# Patient Record
Sex: MALE | HISPANIC OR LATINO | URBAN - METROPOLITAN AREA
[De-identification: names, ages, dates, MRNs, and addresses within clinical notes are randomized per-mention and may not be internally consistent; named-entity substitution may affect disease eponyms.]

---

## 2017-06-26 ENCOUNTER — INPATIENT (INPATIENT)
Facility: HOSPITAL | Age: 32
LOS: 1 days | Discharge: HOME | End: 2017-06-28
Attending: INTERNAL MEDICINE | Admitting: INTERNAL MEDICINE
Payer: MEDICAID

## 2017-06-26 VITALS — WEIGHT: 149.91 LBS | HEIGHT: 66 IN

## 2017-06-26 DIAGNOSIS — Z98.89 OTHER SPECIFIED POSTPROCEDURAL STATES: Chronic | ICD-10-CM

## 2017-06-26 LAB
ALBUMIN SERPL ELPH-MCNC: 3.8 G/DL — SIGNIFICANT CHANGE UP (ref 3.3–5)
ALP SERPL-CCNC: 84 U/L — SIGNIFICANT CHANGE UP (ref 40–120)
ALT FLD-CCNC: 53 U/L — SIGNIFICANT CHANGE UP (ref 12–78)
ANION GAP SERPL CALC-SCNC: 8 MMOL/L — SIGNIFICANT CHANGE UP (ref 5–17)
APPEARANCE UR: CLEAR — SIGNIFICANT CHANGE UP
APTT BLD: 30.6 SEC — SIGNIFICANT CHANGE UP (ref 27.5–37.4)
AST SERPL-CCNC: 41 U/L — HIGH (ref 15–37)
BACTERIA # UR AUTO: (no result)
BASOPHILS # BLD AUTO: 0.1 K/UL — SIGNIFICANT CHANGE UP (ref 0–0.2)
BASOPHILS NFR BLD AUTO: 2.1 % — HIGH (ref 0–2)
BILIRUB SERPL-MCNC: 2 MG/DL — HIGH (ref 0.2–1.2)
BILIRUB UR-MCNC: NEGATIVE — SIGNIFICANT CHANGE UP
BUN SERPL-MCNC: 16 MG/DL — SIGNIFICANT CHANGE UP (ref 7–23)
CALCIUM SERPL-MCNC: 9.1 MG/DL — SIGNIFICANT CHANGE UP (ref 8.5–10.1)
CHLORIDE SERPL-SCNC: 101 MMOL/L — SIGNIFICANT CHANGE UP (ref 96–108)
CO2 SERPL-SCNC: 28 MMOL/L — SIGNIFICANT CHANGE UP (ref 22–31)
COLOR SPEC: (no result)
CREAT SERPL-MCNC: 1.46 MG/DL — HIGH (ref 0.5–1.3)
DIFF PNL FLD: (no result)
EOSINOPHIL # BLD AUTO: 0 K/UL — SIGNIFICANT CHANGE UP (ref 0–0.5)
EOSINOPHIL NFR BLD AUTO: 1 % — SIGNIFICANT CHANGE UP (ref 0–6)
EPI CELLS # UR: SIGNIFICANT CHANGE UP
GLUCOSE SERPL-MCNC: 109 MG/DL — HIGH (ref 70–99)
GLUCOSE UR QL: NEGATIVE MG/DL — SIGNIFICANT CHANGE UP
HCT VFR BLD CALC: 45 % — SIGNIFICANT CHANGE UP (ref 39–50)
HGB BLD-MCNC: 15.4 G/DL — SIGNIFICANT CHANGE UP (ref 13–17)
INR BLD: 1.18 RATIO — HIGH (ref 0.88–1.16)
KETONES UR-MCNC: NEGATIVE — SIGNIFICANT CHANGE UP
LACTATE SERPL-SCNC: 1.4 MMOL/L — SIGNIFICANT CHANGE UP (ref 0.7–2)
LEUKOCYTE ESTERASE UR-ACNC: (no result)
LYMPHOCYTES # BLD AUTO: 2.4 K/UL — SIGNIFICANT CHANGE UP (ref 1–3.3)
LYMPHOCYTES # BLD AUTO: 41 % — SIGNIFICANT CHANGE UP (ref 13–44)
MANUAL DIF COMMENT BLD-IMP: SIGNIFICANT CHANGE UP
MCHC RBC-ENTMCNC: 29.7 PG — SIGNIFICANT CHANGE UP (ref 27–34)
MCHC RBC-ENTMCNC: 34.1 GM/DL — SIGNIFICANT CHANGE UP (ref 32–36)
MCV RBC AUTO: 87.1 FL — SIGNIFICANT CHANGE UP (ref 80–100)
MONOCYTES # BLD AUTO: 1.4 K/UL — HIGH (ref 0–0.9)
MONOCYTES NFR BLD AUTO: 18 % — HIGH (ref 2–14)
NEUTROPHILS # BLD AUTO: 2 K/UL — SIGNIFICANT CHANGE UP (ref 1.8–7.4)
NEUTROPHILS NFR BLD AUTO: 31 % — LOW (ref 43–77)
NEUTS BAND # BLD: 1 % — SIGNIFICANT CHANGE UP (ref 0–8)
NITRITE UR-MCNC: NEGATIVE — SIGNIFICANT CHANGE UP
PH UR: 9 — HIGH (ref 5–8)
PLAT MORPH BLD: (no result)
PLATELET # BLD AUTO: (no result) (ref 150–400)
POTASSIUM SERPL-MCNC: 4.2 MMOL/L — SIGNIFICANT CHANGE UP (ref 3.5–5.3)
POTASSIUM SERPL-SCNC: 4.2 MMOL/L — SIGNIFICANT CHANGE UP (ref 3.5–5.3)
PROT SERPL-MCNC: 8.8 GM/DL — HIGH (ref 6–8.3)
PROT UR-MCNC: 30 MG/DL
PROTHROM AB SERPL-ACNC: 12.8 SEC — HIGH (ref 9.8–12.7)
RAPID RVP RESULT: SIGNIFICANT CHANGE UP
RBC # BLD: 5.18 M/UL — SIGNIFICANT CHANGE UP (ref 4.2–5.8)
RBC # FLD: 11.6 % — SIGNIFICANT CHANGE UP (ref 10.3–14.5)
RBC BLD AUTO: NORMAL — SIGNIFICANT CHANGE UP
RBC CASTS # UR COMP ASSIST: (no result) /HPF (ref 0–4)
SODIUM SERPL-SCNC: 137 MMOL/L — SIGNIFICANT CHANGE UP (ref 135–145)
SP GR SPEC: 1.01 — SIGNIFICANT CHANGE UP (ref 1.01–1.02)
UROBILINOGEN FLD QL: 8 MG/DL
VARIANT LYMPHS # BLD: 8 % — HIGH (ref 0–6)
WBC # BLD: 5.9 K/UL — SIGNIFICANT CHANGE UP (ref 3.8–10.5)
WBC # FLD AUTO: 5.9 K/UL — SIGNIFICANT CHANGE UP (ref 3.8–10.5)
WBC UR QL: SIGNIFICANT CHANGE UP

## 2017-06-26 PROCEDURE — 99285 EMERGENCY DEPT VISIT HI MDM: CPT

## 2017-06-26 PROCEDURE — 71020: CPT | Mod: 26

## 2017-06-26 PROCEDURE — 93010 ELECTROCARDIOGRAM REPORT: CPT

## 2017-06-26 RX ORDER — ACETAMINOPHEN 500 MG
650 TABLET ORAL EVERY 6 HOURS
Qty: 0 | Refills: 0 | Status: DISCONTINUED | OUTPATIENT
Start: 2017-06-26 | End: 2017-06-28

## 2017-06-26 RX ORDER — AZITHROMYCIN 500 MG/1
500 TABLET, FILM COATED ORAL ONCE
Qty: 0 | Refills: 0 | Status: COMPLETED | OUTPATIENT
Start: 2017-06-26 | End: 2017-06-26

## 2017-06-26 RX ORDER — ONDANSETRON 8 MG/1
4 TABLET, FILM COATED ORAL EVERY 6 HOURS
Qty: 0 | Refills: 0 | Status: DISCONTINUED | OUTPATIENT
Start: 2017-06-26 | End: 2017-06-28

## 2017-06-26 RX ORDER — SODIUM CHLORIDE 9 MG/ML
1000 INJECTION INTRAMUSCULAR; INTRAVENOUS; SUBCUTANEOUS
Qty: 0 | Refills: 0 | Status: DISCONTINUED | OUTPATIENT
Start: 2017-06-26 | End: 2017-06-28

## 2017-06-26 RX ORDER — AZITHROMYCIN 500 MG/1
250 TABLET, FILM COATED ORAL DAILY
Qty: 0 | Refills: 0 | Status: DISCONTINUED | OUTPATIENT
Start: 2017-06-27 | End: 2017-06-28

## 2017-06-26 RX ORDER — ACETAMINOPHEN 500 MG
1000 TABLET ORAL ONCE
Qty: 0 | Refills: 0 | Status: COMPLETED | OUTPATIENT
Start: 2017-06-26 | End: 2017-06-26

## 2017-06-26 RX ORDER — ATOVAQUONE 750 MG/5ML
750 SUSPENSION ORAL ONCE
Qty: 0 | Refills: 0 | Status: COMPLETED | OUTPATIENT
Start: 2017-06-26 | End: 2017-06-26

## 2017-06-26 RX ORDER — ATOVAQUONE 750 MG/5ML
750 SUSPENSION ORAL
Qty: 0 | Refills: 0 | Status: DISCONTINUED | OUTPATIENT
Start: 2017-06-26 | End: 2017-06-28

## 2017-06-26 RX ORDER — SODIUM CHLORIDE 9 MG/ML
2000 INJECTION INTRAMUSCULAR; INTRAVENOUS; SUBCUTANEOUS ONCE
Qty: 0 | Refills: 0 | Status: COMPLETED | OUTPATIENT
Start: 2017-06-26 | End: 2017-06-26

## 2017-06-26 RX ADMIN — Medication 100 MILLIGRAM(S): at 15:35

## 2017-06-26 RX ADMIN — SODIUM CHLORIDE 2000 MILLILITER(S): 9 INJECTION INTRAMUSCULAR; INTRAVENOUS; SUBCUTANEOUS at 12:59

## 2017-06-26 RX ADMIN — ATOVAQUONE 750 MILLIGRAM(S): 750 SUSPENSION ORAL at 15:35

## 2017-06-26 RX ADMIN — Medication 1000 MILLIGRAM(S): at 16:31

## 2017-06-26 RX ADMIN — AZITHROMYCIN 500 MILLIGRAM(S): 500 TABLET, FILM COATED ORAL at 15:35

## 2017-06-26 RX ADMIN — Medication 650 MILLIGRAM(S): at 23:07

## 2017-06-26 RX ADMIN — ATOVAQUONE 750 MILLIGRAM(S): 750 SUSPENSION ORAL at 23:08

## 2017-06-26 RX ADMIN — Medication 100 MILLIGRAM(S): at 23:07

## 2017-06-26 RX ADMIN — SODIUM CHLORIDE 100 MILLILITER(S): 9 INJECTION INTRAMUSCULAR; INTRAVENOUS; SUBCUTANEOUS at 23:05

## 2017-06-26 NOTE — PATIENT PROFILE ADULT. - LANGUAGE ASSISTANCE NEEDED
No-Patient/Caregiver offered and refused free interpretation services./Patient refused  phone at this time, but phone left at bedside with explanation of its use given to patient.

## 2017-06-26 NOTE — PROVIDER CONTACT NOTE (CRITICAL VALUE NOTIFICATION) - SITUATION
JENNIFER De La Rosa notified of above results.  JENNIFER De La Rosa will contact Dr. Naqvi and call back if any changes.

## 2017-06-26 NOTE — ED STATDOCS - PROGRESS NOTE DETAILS
JENNIFER Serrato:   Patient has been seen, evaluated and orders have been written by the attending in intake. Patient is stable.  I will follow up the results of orders written and I will continue to evaluate/observe the patient.  Took Motrin 2 am.  Cough reported.  Denies N/V/D, abdominal pain.    Taylor Serrato PA-C Via interpretor [id number] pt. made aware he must be admitted for medication for Babesiosis.  Taylor Serrato PA-C

## 2017-06-26 NOTE — PATIENT PROFILE ADULT. - VISION (WITH CORRECTIVE LENSES IF THE PATIENT USUALLY WEARS THEM):
Partially impaired: cannot see medication labels or newsprint, but can see obstacles in path, and the surrounding layout; can count fingers at arm's length/uses reading glasses

## 2017-06-26 NOTE — H&P ADULT - HISTORY OF PRESENT ILLNESS
Citizen of [place] speaker,  ID # [id number]    32 y.o. male with no significant PMH presents with fever, chills, HA and body aches for the past week. Pt noticed progression of symptoms and having chest pain with deep inspiration that is exacerbated by cough. Pt feeling dizzy and lightheadedness. No sick contacts or foreign travel recently. Denies insect bites or tick bites. No abd pain, dysuria, or diarrhea. No skin rash. After treatment in ED, pt feeling a little better. Pt works in [employer], [employer]. In ED, pt had fever 102, per PA.    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

## 2017-06-26 NOTE — PROVIDER CONTACT NOTE (OTHER) - SITUATION
Received pt. from ED.  Upon looking at orders RN noticed no DVT prohylaxis except for ambulation.  RN checked labs and Platlets resulted as clumped.  RN notified Dr. Naqvi.

## 2017-06-26 NOTE — H&P ADULT - ASSESSMENT
H[place]ian speaker,  ID # [id number]    32 y.o. male with no significant PMH presents with fever, chills, HA and body aches for the past week. Pt noticed progression of symptoms and having chest pain with deep inspiration that is exacerbated by cough. Pt feeling dizzy and lightheadedness. No sick contacts or foreign travel recently. Denies insect bites or tick bites. No abd pain, dysuria, or diarrhea. No skin rash. After treatment in ED, pt feeling a little better. Pt works in [employer], [employer]. In ED, pt had fever 102.8.    #sepsis (fever 102.8, tachycardia 113) due to parasite infection, Babesiosis  #ALISON  #transaminitis  #lightheaded, dizzy  -per ED, babesia on peripheral smear  -admit to med surg  -f/u blood parasite culture  -obtain blood culture  -continue IV hydration  -check west nile virus, ehrlichia Ab, Lyme C6 Ab, viral hepatitis panel  -cont atorvaquone 750mg BID, azithro 250mg daily starting 6/27, doxycycline 100mg BID  -antipyretics  -ID consult, Dr Casanova  -EKG reviewed with my interpretation: HR 93, QTc 425, normal sinus rhythm, normal axis, no ST-T changes    #DVT ppx  -early ambulation Vietnamese speaker,  ID # [id number]    32 y.o. male with no significant PMH presents with fever, chills, HA and body aches for the past week. Pt noticed progression of symptoms and having chest pain with deep inspiration that is exacerbated by cough. Pt feeling dizzy and lightheadedness. No sick contacts or foreign travel recently. Denies insect bites or tick bites. No abd pain, dysuria, or diarrhea. No skin rash. After treatment in ED, pt feeling a little better. Pt works in [employer], [employer] In ED, pt had fever 102.8.    #sepsis (fever 102.8, tachycardia 113) due to parasite infection, Babesiosis  #ALISON  #transaminitis  #lightheaded, dizzy  -per ED, babesia on peripheral smear  -admit to med surg  -f/u blood parasite culture  -obtain blood culture  -continue IV hydration  -check west nile virus, ehrlichia Ab, Lyme C6 Ab, viral hepatitis panel  -cont atorvaquone 750mg BID, azithro 250mg daily starting 6/27, doxycycline 100mg BID  -antipyretics  -case discussed over phone with ID consult, Dr Casanova  -EKG reviewed with my interpretation: HR 93, QTc 425, normal sinus rhythm, normal axis, no ST-T changes    #DVT ppx  -early ambulation

## 2017-06-26 NOTE — ED STATDOCS - OBJECTIVE STATEMENT
33 y/o male with no PMHx presents to the ED c/o fever, HA, neck pain, CP and upper back pain starting a week ago. Pt also states slight productive cough, that is worse at night. Denies SOB, neck stiffness, abd pain. Non smoker. Drinks socially. No recent travel. No recent sick contact. Pt recently had surgery on eye after injury. Pt gets lightheaded and dizzy when walking. 33 y/o male with no PMHx presents to the ED c/o fever, HA, neck pain, CP and upper back pain starting a week ago. Pt also states slight productive cough, that is worse at night. Denies SOB, neck stiffness, abd pain. Non smoker. Drinks socially. No recent travel. No recent sick contact. Pt recently had surgery on eye after injury when a wire went through his left eye. Pt gets lightheaded and dizzy when walking. 31 y/o male with no PMHx presents to the ED c/o fever, HA, neck pain, CP and upper back pain starting a week ago. Pt also states slight productive cough, that is worse at night. Denies SOB, neck stiffness, abd pain. Non smoker. Drinks socially. No recent travel. No recent sick contact. Pt recently had surgery on eye after injury when a wire went through his left eye. Pt gets lightheaded and dizzy when walking. Pt took two Motrin this morning.

## 2017-06-27 DIAGNOSIS — Z98.890 OTHER SPECIFIED POSTPROCEDURAL STATES: Chronic | ICD-10-CM

## 2017-06-27 LAB
ANION GAP SERPL CALC-SCNC: 5 MMOL/L — SIGNIFICANT CHANGE UP (ref 5–17)
B BURGDOR C6 AB SER-ACNC: NEGATIVE — SIGNIFICANT CHANGE UP
B BURGDOR IGG+IGM SER-ACNC: 0.49 INDEX — SIGNIFICANT CHANGE UP (ref 0.01–0.89)
BASOPHILS # BLD AUTO: 0.1 K/UL — SIGNIFICANT CHANGE UP (ref 0–0.2)
BUN SERPL-MCNC: 11 MG/DL — SIGNIFICANT CHANGE UP (ref 7–23)
CALCIUM SERPL-MCNC: 7.9 MG/DL — LOW (ref 8.5–10.1)
CHLORIDE SERPL-SCNC: 106 MMOL/L — SIGNIFICANT CHANGE UP (ref 96–108)
CLOSURE TME COLL+EPINEP BLD: 118 K/UL — LOW (ref 150–400)
CO2 SERPL-SCNC: 26 MMOL/L — SIGNIFICANT CHANGE UP (ref 22–31)
CREAT SERPL-MCNC: 1.13 MG/DL — SIGNIFICANT CHANGE UP (ref 0.5–1.3)
CULTURE RESULTS: SIGNIFICANT CHANGE UP
CULTURE RESULTS: SIGNIFICANT CHANGE UP
EOSINOPHIL # BLD AUTO: 0.1 K/UL — SIGNIFICANT CHANGE UP (ref 0–0.5)
EOSINOPHIL NFR BLD AUTO: 1 % — SIGNIFICANT CHANGE UP (ref 0–6)
GIANT PLATELETS BLD QL SMEAR: SIGNIFICANT CHANGE UP
GLUCOSE SERPL-MCNC: 93 MG/DL — SIGNIFICANT CHANGE UP (ref 70–99)
HAV IGM SER-ACNC: SIGNIFICANT CHANGE UP
HBV CORE IGM SER-ACNC: SIGNIFICANT CHANGE UP
HBV SURFACE AG SER-ACNC: REACTIVE
HCT VFR BLD CALC: 36.8 % — LOW (ref 39–50)
HCV AB S/CO SERPL IA: 0.37 S/CO — SIGNIFICANT CHANGE UP
HCV AB SERPL-IMP: SIGNIFICANT CHANGE UP
HGB BLD-MCNC: 12.3 G/DL — LOW (ref 13–17)
LYME C6 AB IGG/IGM EIA REFLEX WESTERN BL: SIGNIFICANT CHANGE UP
LYMPHOCYTES # BLD AUTO: 2.5 K/UL — SIGNIFICANT CHANGE UP (ref 1–3.3)
LYMPHOCYTES # BLD AUTO: 40 % — SIGNIFICANT CHANGE UP (ref 13–44)
MAGNESIUM SERPL-MCNC: 2.3 MG/DL — SIGNIFICANT CHANGE UP (ref 1.6–2.6)
MANUAL DIF COMMENT BLD-IMP: SIGNIFICANT CHANGE UP
MCHC RBC-ENTMCNC: 29.4 PG — SIGNIFICANT CHANGE UP (ref 27–34)
MCHC RBC-ENTMCNC: 33.3 GM/DL — SIGNIFICANT CHANGE UP (ref 32–36)
MCV RBC AUTO: 88.3 FL — SIGNIFICANT CHANGE UP (ref 80–100)
MONOCYTES # BLD AUTO: 1.8 K/UL — HIGH (ref 0–0.9)
MONOCYTES NFR BLD AUTO: 28 % — HIGH (ref 2–14)
NEUTROPHILS # BLD AUTO: 1.7 K/UL — LOW (ref 1.8–7.4)
NEUTROPHILS NFR BLD AUTO: 23 % — LOW (ref 43–77)
NEUTS BAND # BLD: 2 % — SIGNIFICANT CHANGE UP (ref 0–8)
PHOSPHATE SERPL-MCNC: 2.3 MG/DL — LOW (ref 2.5–4.5)
PLAT MORPH BLD: (no result)
PLATELET # BLD AUTO: 112 K/UL — LOW (ref 150–400)
POTASSIUM SERPL-MCNC: 4.2 MMOL/L — SIGNIFICANT CHANGE UP (ref 3.5–5.3)
POTASSIUM SERPL-SCNC: 4.2 MMOL/L — SIGNIFICANT CHANGE UP (ref 3.5–5.3)
RBC # BLD: 4.17 M/UL — LOW (ref 4.2–5.8)
RBC # FLD: 12.3 % — SIGNIFICANT CHANGE UP (ref 10.3–14.5)
RBC BLD AUTO: NORMAL — SIGNIFICANT CHANGE UP
SODIUM SERPL-SCNC: 137 MMOL/L — SIGNIFICANT CHANGE UP (ref 135–145)
SPECIMEN SOURCE: SIGNIFICANT CHANGE UP
SPECIMEN SOURCE: SIGNIFICANT CHANGE UP
VARIANT LYMPHS # BLD: 6 % — SIGNIFICANT CHANGE UP (ref 0–6)
WBC # BLD: 6.2 K/UL — SIGNIFICANT CHANGE UP (ref 3.8–10.5)
WBC # FLD AUTO: 6.2 K/UL — SIGNIFICANT CHANGE UP (ref 3.8–10.5)

## 2017-06-27 PROCEDURE — 76700 US EXAM ABDOM COMPLETE: CPT | Mod: 26

## 2017-06-27 RX ORDER — IBUPROFEN 200 MG
0 TABLET ORAL
Qty: 0 | Refills: 0 | COMMUNITY

## 2017-06-27 RX ADMIN — Medication 650 MILLIGRAM(S): at 22:00

## 2017-06-27 RX ADMIN — SODIUM CHLORIDE 100 MILLILITER(S): 9 INJECTION INTRAMUSCULAR; INTRAVENOUS; SUBCUTANEOUS at 05:25

## 2017-06-27 RX ADMIN — SODIUM CHLORIDE 100 MILLILITER(S): 9 INJECTION INTRAMUSCULAR; INTRAVENOUS; SUBCUTANEOUS at 18:13

## 2017-06-27 RX ADMIN — ATOVAQUONE 750 MILLIGRAM(S): 750 SUSPENSION ORAL at 17:04

## 2017-06-27 RX ADMIN — AZITHROMYCIN 250 MILLIGRAM(S): 500 TABLET, FILM COATED ORAL at 12:04

## 2017-06-27 RX ADMIN — Medication 100 MILLIGRAM(S): at 05:24

## 2017-06-27 RX ADMIN — ATOVAQUONE 750 MILLIGRAM(S): 750 SUSPENSION ORAL at 05:24

## 2017-06-27 NOTE — PROGRESS NOTE ADULT - SUBJECTIVE AND OBJECTIVE BOX
CHIEF COMPLAINT:  fever, chills, HA and body aches for the past week    SUBJECTIVE: no complaints this AM. No dysuria.    REVIEW OF SYSTEMS:  All other review of systems is negative unless indicated above    Vital Signs Last 24 Hrs  T(C): 36.9 (27 Jun 2017 11:14), Max: 37.3 (26 Jun 2017 19:00)  T(F): 98.4 (27 Jun 2017 11:14), Max: 99.1 (26 Jun 2017 19:00)  HR: 88 (27 Jun 2017 11:14) (73 - 88)  BP: 116/60 (27 Jun 2017 11:14) (105/60 - 120/73)  BP(mean): 73 (26 Jun 2017 19:00) (73 - 73)  RR: 16 (27 Jun 2017 11:14) (16 - 18)  SpO2: 99% (27 Jun 2017 11:14) (99% - 100%)    PHYSICAL EXAM:  GEN: appears comfortable  Neuro: AAOx3, nonfocal  HEENT: NC/AT, EOMI  Neck: no thyroidmegaly, no JVD  Cardiovascular: S1S2 present, regular rhythm, no murmur, tachycardia  Respiratory: breath sounds normal bilaterally, no wheezing, no rales, no rhonchi  Gastrointestinal: bowel sounds normal, soft, no abdominal tenderness  Musculoskeletal: no muscle tenderness  Extremities: No edema  Skin: No rash, no insect bites    MEDICATIONS:  MEDICATIONS  (STANDING):  sodium chloride 0.9%. 1000 milliLiter(s) (100 mL/Hr) IV Continuous <Continuous>  atovaquone Suspension 750 milliGRAM(s) Oral two times a day  azithromycin   Tablet 250 milliGRAM(s) Oral daily      LABS: All Labs Reviewed:                        12.3   6.2   )-----------( 112      ( 27 Jun 2017 06:34 )             36.8     06-27    137  |  106  |  11  ----------------------------<  93  4.2   |  26  |  1.13    Ca    7.9<L>      27 Jun 2017 06:34  Phos  2.3     06-27  Mg     2.3     06-27    TPro  8.8<H>  /  Alb  3.8  /  TBili  2.0<H>  /  DBili  x   /  AST  41<H>  /  ALT  53  /  AlkPhos  84  06-26    PT/INR - ( 26 Jun 2017 12:55 )   PT: 12.8 sec;   INR: 1.18 ratio         PTT - ( 26 Jun 2017 12:55 )  PTT:30.6 sec      A/P  32 y.o. male with no significant PMH presents with fever, chills, HA and body aches for the past week. Pt noticed progression of symptoms and having chest pain with deep inspiration that is exacerbated by cough. Pt feeling dizzy and lightheadedness. No sick contacts or foreign travel recently. Denies insect bites or tick bites. No abd pain, dysuria, or diarrhea. No skin rash. After treatment in ED, pt feeling a little better. Pt works in [employer], [employer]. In ED, pt had fever 102.8.  Admitted for babesiosis and found to be hep B positive.    #sepsis (fever 102.8, tachycardia 113) due to parasite infection, Babesiosis  - parasite culture +babesia <1%  - lyme studies negative  - c/w atovaquone and azithro  - doxy dc'd    # Hep B  - HBsAg +  - check remainder of hep b studies    # Anemia- eval for babesiosis related hemolyisis  - LDH, haptoglobin  - monitor Hgb    # thrombocytopenia  - likely relatd to babesiosis  - monitor    # Neutropenia  - Possibly related to babesiosis  - monitor    #ALISON- resolved    #mildly elevated bilirubin possibly related to hemolysis less likely liver etio    #DVT ppx  -early ambulation    Appreicate ID consult

## 2017-06-27 NOTE — CONSULT NOTE ADULT - SUBJECTIVE AND OBJECTIVE BOX
Patient is a 32y old  Male who presents with a chief complaint of fever, chills, HA and body aches for the past week (2017 18:19)      HPI:      32 y.o. male with no significant PMH presents with fever, chills, HA and body aches for the past week. Pt noticed progression of symptoms and having chest pain with deep inspiration that is exacerbated by cough. Pt feeling dizzy and lightheadedness. No sick contacts or foreign travel recently. 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 No abd pain, dysuria, or diarrhea. No skin rash. Denies hx of IV drug use, blood transfusions, is sexually active with multiple partners but no hx of STDs or hepatitis in the past. Here febrile to 102, elevated ast, low plts to 118 noted, peripheral smear positive for babesiosis, pt was started on atovaquone/azithromycin and doxycycline for concern for co-infection. Hep B Ag testing positive.    PMH: as above  PSH: L eye surgery, s/p lens implant last year and replacement 1 month ago          Meds: per reconciliation sheet, noted below    MEDICATIONS  (STANDING):  sodium chloride 0.9%. 1000 milliLiter(s) (100 mL/Hr) IV Continuous <Continuous>  atovaquone Suspension 750 milliGRAM(s) Oral two times a day  azithromycin   Tablet 250 milliGRAM(s) Oral daily  doxycycline hyclate Capsule 100 milliGRAM(s) Oral every 12 hours      Allergies    No Known Allergies    Intolerances        Social: no smoking, social alcohol use, no illegal drugs; no recent travel, no exposure to TB    Family history: NC      ROS: no dizziness, no sore throat, no blurry vision, no CP, no palpitations, no SOB, no cough, no abdominal pain, no diarrhea, no N/V, no dysuria, no leg pain, no claudication, no rash, no joint aches, no rectal pain or bleeding, no night sweats    Vital Signs Last 24 Hrs  T(C): 36.9 (2017 11:14), Max: 39.3 (2017 16:22)  T(F): 98.4 (2017 11:14), Max: 102.8 (2017 16:22)  HR: 88 (2017 11:14) (73 - 98)  BP: 116/60 (2017 11:14) (105/60 - 120/73)  BP(mean): 73 (2017 19:00) (73 - 73)  RR: 16 (2017 11:14) (16 - 18)  SpO2: 99% (2017 11:14) (99% - 100%)      PE:  Constitutional: NAD  HEENT: NC/AT, EOMI, PERRLA  Neck: supple  Back: no tenderness  Respiratory: clear  Cardiovascular: S1S2 regular, no murmurs  Abdomen: soft, not tender, not distended, positive BS  Genitourinary: deferred  Rectal: deferred  Musculoskeletal: no muscle tenderness, no joint swelling or tenderness  Extremities: no pedal edema  Neurological:  no focal deficits  Skin: no rashes    Labs:                        12.3   6.2   )-----------( 112      ( 2017 06:34 )             36.8     06-    137  |  106  |  11  ----------------------------<  93  4.2   |  26  |  1.13    Ca    7.9<L>      2017 06:34  Phos  2.3     -  Mg     2.3         TPro  8.8<H>  /  Alb  3.8  /  TBili  2.0<H>  /  DBili  x   /  AST  41<H>  /  ALT  53  /  AlkPhos  84  06-     LIVER FUNCTIONS - ( 2017 12:55 )  Alb: 3.8 g/dL / Pro: 8.8 gm/dL / ALK PHOS: 84 U/L / ALT: 53 U/L / AST: 41 U/L / GGT: x           Urinalysis Basic - ( 2017 12:55 )    Color: Tavia / Appearance: Clear / S.015 / pH: x  Gluc: x / Ketone: Negative  / Bili: Negative / Urobili: 8 mg/dL   Blood: x / Protein: 30 mg/dL / Nitrite: Negative   Leuk Esterase: Trace / RBC: 3-5 /HPF / WBC 0-2   Sq Epi: x / Non Sq Epi: x / Bacteria: Few    Blood Parasites (17 @ 17:31)    Culture Results:   Babesia species  by Giemsa Stain  Parasitemia = <1 %            Radiology:   < from: Xray Chest 2 Views PA/Lat (17 @ 12:58) >    EXAM:  CHEST P.A. LATERAL                            PROCEDURE DATE:  2017        INTERPRETATION:  CHEST P.A. LATERAL    HISTORY:  Chest pain    Views:  PA and Lateral chest.       Comparison:  none.    The lung fields demonstrate normal degree of inflation.    There is no evidence of pneumonia.    The pulmonary interstitial markings are normal.    There is no evidence of hilar enlargement.    The thoracic aorta outlines normally.    The cardiac size is normal.    The diaphragm is smooth.    There are no pleural effusions. Costophrenic sulci are sharp.     Thoracic curvature is normal.    IMPRESSION: Normal chest radiograph.                            < end of copied text >    Advanced directives addressed: full resuscitation

## 2017-06-27 NOTE — CONSULT NOTE ADULT - ASSESSMENT
32 y.o. male with no significant PMH presents with fever, chills, HA and body aches for the past week. Pt noticed progression of symptoms and having chest pain with deep inspiration that is exacerbated by cough. Pt feeling dizzy and lightheadedness. No sick contacts or foreign travel recently. 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 No abd pain, dysuria, or diarrhea. No skin rash. Denies hx of IV drug use, blood transfusions, is sexually active with multiple partners but no hx of STDs or hepatitis in the past. Here febrile to 102, elevated ast, low plts to 118 noted, peripheral smear positive for babesiosis, pt was started on atovaquone/azithromycin and doxycycline for concern for co-infection. Hep B Ag testing positive.    1. Babesiosis. Hep B s Ag positive.    - agree with atovaquone 750mg BID + azithromycin 500 mg given yesterday now on [id number] day#2 - plan for total 7-10 day course    - lyme screen negative, will discontinue doxycycline     - f/u ehrlichiosis ab, west nile serology    - hep b surface antigen positive, denies prior hx of hep B, will send hep B S antibody, total core ab (igM core (-)), Hb e ag, ab, heb B viral load, and HIV testing    - check abd ultrasound    - hepatitis C testing (-)    - f/u cbc    - monitor temps    - supportive care    - -tolerating abx well so far; no side effects noted    -reason for abx use and side effects reviewed with patient

## 2017-06-28 VITALS
DIASTOLIC BLOOD PRESSURE: 71 MMHG | SYSTOLIC BLOOD PRESSURE: 124 MMHG | TEMPERATURE: 99 F | HEART RATE: 95 BPM | RESPIRATION RATE: 16 BRPM

## 2017-06-28 LAB
ALBUMIN SERPL ELPH-MCNC: 3 G/DL — LOW (ref 3.3–5)
ALP SERPL-CCNC: 62 U/L — SIGNIFICANT CHANGE UP (ref 40–120)
ALT FLD-CCNC: 36 U/L — SIGNIFICANT CHANGE UP (ref 12–78)
ANION GAP SERPL CALC-SCNC: 5 MMOL/L — SIGNIFICANT CHANGE UP (ref 5–17)
AST SERPL-CCNC: 24 U/L — SIGNIFICANT CHANGE UP (ref 15–37)
BILIRUB SERPL-MCNC: 1.2 MG/DL — SIGNIFICANT CHANGE UP (ref 0.2–1.2)
BUN SERPL-MCNC: 10 MG/DL — SIGNIFICANT CHANGE UP (ref 7–23)
CALCIUM SERPL-MCNC: 8.4 MG/DL — LOW (ref 8.5–10.1)
CHLORIDE SERPL-SCNC: 106 MMOL/L — SIGNIFICANT CHANGE UP (ref 96–108)
CO2 SERPL-SCNC: 28 MMOL/L — SIGNIFICANT CHANGE UP (ref 22–31)
CREAT SERPL-MCNC: 1.09 MG/DL — SIGNIFICANT CHANGE UP (ref 0.5–1.3)
GLUCOSE SERPL-MCNC: 95 MG/DL — SIGNIFICANT CHANGE UP (ref 70–99)
HAPTOGLOB SERPL-MCNC: <20 MG/DL — LOW (ref 34–200)
HBV CORE AB SER-ACNC: REACTIVE
HBV E AB SER-ACNC: POSITIVE
HBV E AG SER-ACNC: NEGATIVE — SIGNIFICANT CHANGE UP
HBV SURFACE AB SER-ACNC: SIGNIFICANT CHANGE UP
HCT VFR BLD CALC: 35.5 % — LOW (ref 39–50)
HGB BLD-MCNC: 11.9 G/DL — LOW (ref 13–17)
HIV 1+2 AB+HIV1 P24 AG SERPL QL IA: SIGNIFICANT CHANGE UP
INR BLD: 1.21 RATIO — HIGH (ref 0.88–1.16)
LDH SERPL L TO P-CCNC: 448 U/L — HIGH (ref 50–242)
MCHC RBC-ENTMCNC: 29.8 PG — SIGNIFICANT CHANGE UP (ref 27–34)
MCHC RBC-ENTMCNC: 33.5 GM/DL — SIGNIFICANT CHANGE UP (ref 32–36)
MCV RBC AUTO: 89 FL — SIGNIFICANT CHANGE UP (ref 80–100)
PLATELET # BLD AUTO: 145 K/UL — LOW (ref 150–400)
POTASSIUM SERPL-MCNC: 4.2 MMOL/L — SIGNIFICANT CHANGE UP (ref 3.5–5.3)
POTASSIUM SERPL-SCNC: 4.2 MMOL/L — SIGNIFICANT CHANGE UP (ref 3.5–5.3)
PROT SERPL-MCNC: 6.9 GM/DL — SIGNIFICANT CHANGE UP (ref 6–8.3)
PROTHROM AB SERPL-ACNC: 13.1 SEC — HIGH (ref 9.8–12.7)
RBC # BLD: 3.99 M/UL — LOW (ref 4.2–5.8)
RBC # FLD: 12.2 % — SIGNIFICANT CHANGE UP (ref 10.3–14.5)
SODIUM SERPL-SCNC: 139 MMOL/L — SIGNIFICANT CHANGE UP (ref 135–145)
WBC # BLD: 6 K/UL — SIGNIFICANT CHANGE UP (ref 3.8–10.5)
WBC # FLD AUTO: 6 K/UL — SIGNIFICANT CHANGE UP (ref 3.8–10.5)

## 2017-06-28 RX ORDER — AZITHROMYCIN 500 MG/1
1 TABLET, FILM COATED ORAL
Qty: 6 | Refills: 0 | OUTPATIENT
Start: 2017-06-28 | End: 2017-07-04

## 2017-06-28 RX ORDER — ATOVAQUONE 750 MG/5ML
5 SUSPENSION ORAL
Qty: 60 | Refills: 0 | OUTPATIENT
Start: 2017-06-28 | End: 2017-07-04

## 2017-06-28 RX ADMIN — SODIUM CHLORIDE 100 MILLILITER(S): 9 INJECTION INTRAMUSCULAR; INTRAVENOUS; SUBCUTANEOUS at 05:28

## 2017-06-28 RX ADMIN — Medication 650 MILLIGRAM(S): at 12:04

## 2017-06-28 RX ADMIN — ATOVAQUONE 750 MILLIGRAM(S): 750 SUSPENSION ORAL at 05:29

## 2017-06-28 RX ADMIN — AZITHROMYCIN 250 MILLIGRAM(S): 500 TABLET, FILM COATED ORAL at 12:02

## 2017-06-28 RX ADMIN — ATOVAQUONE 750 MILLIGRAM(S): 750 SUSPENSION ORAL at 20:00

## 2017-06-28 NOTE — PROGRESS NOTE ADULT - ASSESSMENT
32 y.o. male with no significant PMH presents with fever, chills, HA and body aches for the past week. Pt noticed progression of symptoms and having chest pain with deep inspiration that is exacerbated by cough. Pt feeling dizzy and lightheadedness. No sick contacts or foreign travel recently. 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 No abd pain, dysuria, or diarrhea. No skin rash. Denies hx of IV drug use, blood transfusions, is sexually active with multiple partners but no hx of STDs or hepatitis in the past. Here febrile to 102, elevated ast, low plts to 118 noted, peripheral smear positive for babesiosis, pt was started on atovaquone/azithromycin and doxycycline for concern for co-infection. Hep B Ag testing positive.    1. Babesiosis. Hep B s Ag positive.    - continue with atovaquone 750mg BID + azithromycin [id number] day#3 - plan for total 7-10 day course    - lyme screen negative, doxycycline discontinued    - f/u ehrlichiosis ab, west nile serology    - hep b surface antigen positive, denies prior hx of hep B, f/uhep B S antibody, total core ab (igM core (-)), Hb e ag, ab, heb B viral load, and HIV testing    - abd ultrasound unremarkable    - hepatitis C testing (-)    - f/u cbc    - monitor temps    - supportive care    - -tolerating abx well so far; no side effects noted    -reason for abx use and side effects reviewed with patient

## 2017-06-28 NOTE — DISCHARGE NOTE ADULT - PATIENT PORTAL LINK FT
“You can access the Follow[facility] Patient Portal, offered by [facility], by registering with the following website: [email or web address]”

## 2017-06-28 NOTE — DISCHARGE NOTE ADULT - MEDICATION SUMMARY - MEDICATIONS TO TAKE
I will START or STAY ON the medications listed below when I get home from the hospital:    azithromycin 250 mg oral tablet  -- 1 tab(s) by mouth once a day  -- Indication: For Babesiosis    atovaquone 750 mg/5 mL oral suspension  -- 5 milliliter(s) by mouth 2 times a day  -- Indication: For Babesiosis

## 2017-06-28 NOTE — DISCHARGE NOTE ADULT - CARE PLAN
Principal Discharge DX:	Babesiosis  Goal:	resolution  Instructions for follow-up, activity and diet:	complete course of antibiotics  [facility]  Secondary Diagnosis:	Hepatitis B  Instructions for follow-up, activity and diet:	follow up with [facility] Principal Discharge DX:	Babesiosis  Goal:	resolution  Instructions for follow-up, activity and diet:	complete course of antibiotics  follow up in [facility]  Secondary Diagnosis:	Hepatitis B  Instructions for follow-up, activity and diet:	follow up with [facility] Principal Discharge DX:	Babesiosis  Goal:	resolution  Instructions for follow-up, activity and diet:	complete course of antibiotics  follow [facility] in [facility]  Secondary Diagnosis:	Hepatitis B  Instructions for follow-up, activity and diet:	follow up with [facility]

## 2017-06-28 NOTE — PROGRESS NOTE ADULT - SUBJECTIVE AND OBJECTIVE BOX
32 y.o. male with no significant PMH presents with fever, chills, HA and body aches for the past week. Pt noticed progression of symptoms and having chest pain with deep inspiration that is exacerbated by cough. Pt feeling dizzy and lightheadedness. No sick contacts or foreign travel recently. 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 No abd pain, dysuria, or diarrhea. No skin rash. Denies hx of IV drug use, blood transfusions, is sexually active with multiple partners but no hx of STDs or hepatitis in the past. Here febrile to 102, elevated ast, low plts to 118 noted, peripheral smear positive for babesiosis, pt was started on atovaquone/azithromycin and doxycycline for concern for co-infection. Hep B Ag testing positive.      c/o cough and headaches  headache improved though from admission  tmax 100.3    MEDICATIONS  (STANDING):  atovaquone Suspension 750 milliGRAM(s) Oral two times a day  azithromycin   Tablet 250 milliGRAM(s) Oral daily      Vital Signs Last 24 Hrs  T(C): 36.6 (2017 11:48), Max: 37.9 (2017 20:58)  T(F): 97.9 (2017 11:48), Max: 100.3 (2017 20:58)  HR: 91 (2017 11:48) (67 - 96)  BP: 116/65 (2017 11:48) (103/60 - 127/78)  BP(mean): --  RR: 16 (2017 11:48) (16 - 17)  SpO2: 100% (2017 11:48) (99% - 100%)      PE:  Constitutional: NAD  HEENT: NC/AT, EOMI, PERRLA  Neck: supple  Back: no tenderness  Respiratory: clear  Cardiovascular: S1S2 regular, no murmurs  Abdomen: soft, not tender, not distended, positive BS  Genitourinary: deferred  Rectal: deferred  Musculoskeletal: no muscle tenderness, no joint swelling or tenderness  Extremities: no pedal edema  Neurological:  no focal deficits  Skin: no rashes    Labs:                                   11.9   6.0   )-----------( 145      ( 2017 06:17 )             35.5     06-    139  |  106  |  10  ----------------------------<  95  4.2   |  28  |  1.09    Ca    8.4<L>      2017 06:17  Phos  2.3       Mg     2.3         TPro  6.9  /  Alb  3.0<L>  /  TBili  1.2  /  DBili  x   /  AST  24  /  ALT  36  /  AlkPhos  62             Urinalysis Basic - ( 2017 12:55 )    Color: Tavia / Appearance: Clear / S.015 / pH: x  Gluc: x / Ketone: Negative  / Bili: Negative / Urobili: 8 mg/dL   Blood: x / Protein: 30 mg/dL / Nitrite: Negative   Leuk Esterase: Trace / RBC: 3-5 /HPF / WBC 0-2   Sq Epi: x / Non Sq Epi: x / Bacteria: Few    Blood Parasites (17 @ 17:31)    Culture Results:   Babesia species  by Giemsa Stain  Parasitemia = <1 %    Culture - Blood (17 @ 20:58)    Specimen Source: .Blood None    Culture Results:   No growth to date.    Culture - Urine (17 @ 12:55)    Specimen Source: .Urine None    Culture Results:   <10,000 CFU/ml Normal Urogenital cassia present              Radiology:   < from: Xray Chest 2 Views PA/Lat (17 @ 12:58) >    EXAM:  CHEST P.A. LATERAL                                PROCEDURE DATE:  2017        INTERPRETATION:  CHEST P.A. LATERAL    HISTORY:  Chest pain    Views:  PA and Lateral chest.       Comparison:  none.    The lung fields demonstrate normal degree of inflation.    There is no evidence of pneumonia.    The pulmonary interstitial markings are normal.    There is no evidence of hilar enlargement.    The thoracic aorta outlines normally.    The cardiac size is normal.    The diaphragm is smooth.    There are no pleural effusions. Costophrenic sulci are sharp.     Thoracic curvature is normal.    IMPRESSION: Normal chest radiograph.                            < end of copied text >    < from: US Abdomen Complete (17 @ 14:45) >    EXAM:  US COMPLETE ABDOMEN SONOGRAM                            PROCEDURE DATE:  2017        INTERPRETATION:  EXAMINATION DATE: 2017 at 1426 hours.  CLINICAL INFORMATION: Hepatitis B.    TECHNIQUE: Sonographic evaluation of the abdomen was performed. Grayscale   and color Doppler images were acquired.    COMPARISON: None.    FINDINGS:    Liver:  Normal echogenicity and echotexture. No focal mass.  Bile ducts: No intrahepatic or extrahepatic biliary ductal dilatation.   Common bileduct measures 0.2 cm.   Gallbladder:  Contracted. Normal wall thickness.  No pericholecystic   fluid. No tenderness.   Pancreas: Not well seen due to overlying bowel gas.  Spleen: 12.1 cm. Within normal limits.  Right kidney: 10.7 x 4.6 x 4.6 cm. No hydronephrosis.  Left kidney: 9.2 x 4.5 x 4.8 cm. No hydronephrosis.   Ascites: None.    Other: The visualized Aorta and IVC are within normal limits.    IMPRESSION:    Unremarkable abdominal sonogram.    < end of copied text >      Advanced directives addressed: full resuscitation

## 2017-06-28 NOTE — DISCHARGE NOTE ADULT - CARE PROVIDER_API CALL
Co, Johnathon UPTON), Internal Medicine  [address] [place] [address]  Phone: [phone]  Fax: [phone]

## 2017-06-28 NOTE — DISCHARGE NOTE ADULT - PLAN OF CARE
resolution complete course of antibiotics  follow up in [facility] follow up with [facility]

## 2017-06-29 LAB
B MICROTI IGG TITR SER: (no result) TITER
B MICROTI IGM TITR SER: (no result) TITER

## 2017-06-30 LAB
HBV DNA # SERPL NAA+PROBE: SIGNIFICANT CHANGE UP IU/ML
HBV DNA SERPL NAA+PROBE-LOG#: (no result) LOGIU/ML

## 2017-07-02 DIAGNOSIS — D58.8 OTHER SPECIFIED HEREDITARY HEMOLYTIC ANEMIAS: ICD-10-CM

## 2017-07-02 DIAGNOSIS — B60.0 BABESIOSIS: ICD-10-CM

## 2017-07-02 DIAGNOSIS — N17.9 ACUTE KIDNEY FAILURE, UNSPECIFIED: ICD-10-CM

## 2017-07-02 DIAGNOSIS — D69.6 THROMBOCYTOPENIA, UNSPECIFIED: ICD-10-CM

## 2017-07-02 DIAGNOSIS — B19.10 UNSPECIFIED VIRAL HEPATITIS B WITHOUT HEPATIC COMA: ICD-10-CM

## 2017-07-02 DIAGNOSIS — D70.8 OTHER NEUTROPENIA: ICD-10-CM

## 2017-07-02 LAB
CULTURE RESULTS: SIGNIFICANT CHANGE UP
SPECIMEN SOURCE: SIGNIFICANT CHANGE UP

## 2017-07-03 LAB
A PHAGOCYTOPH IGG TITR SER IF: SIGNIFICANT CHANGE UP
A PHAGOCYTOPH IGG TITR SER IF: SIGNIFICANT CHANGE UP
A PHAGOCYTOPH IGM TITR SER IF: SIGNIFICANT CHANGE UP
E CHAFFEENSIS IGG TITR SER: HIGH
E CHAFFEENSIS IGM TITR SER IF: SIGNIFICANT CHANGE UP
EHRLICHIA AB TITR SER: SIGNIFICANT CHANGE UP

## 2017-07-06 DIAGNOSIS — A41.89 OTHER SPECIFIED SEPSIS: ICD-10-CM

## 2017-10-25 ENCOUNTER — OUTPATIENT (OUTPATIENT)
Dept: OUTPATIENT SERVICES | Facility: HOSPITAL | Age: 32
LOS: 1 days | End: 2017-10-25
Payer: COMMERCIAL

## 2017-10-25 ENCOUNTER — APPOINTMENT (OUTPATIENT)
Dept: ULTRASOUND IMAGING | Facility: CLINIC | Age: 32
End: 2017-10-25

## 2017-10-25 DIAGNOSIS — Z98.890 OTHER SPECIFIED POSTPROCEDURAL STATES: Chronic | ICD-10-CM

## 2017-10-25 DIAGNOSIS — Z98.89 OTHER SPECIFIED POSTPROCEDURAL STATES: Chronic | ICD-10-CM

## 2017-10-25 DIAGNOSIS — Z00.8 ENCOUNTER FOR OTHER GENERAL EXAMINATION: ICD-10-CM

## 2017-10-25 PROBLEM — Z00.00 ENCOUNTER FOR PREVENTIVE HEALTH EXAMINATION: Status: ACTIVE | Noted: 2017-10-25

## 2017-10-25 PROCEDURE — 76700 US EXAM ABDOM COMPLETE: CPT | Mod: 26

## 2017-11-09 ENCOUNTER — OUTPATIENT (OUTPATIENT)
Dept: OUTPATIENT SERVICES | Facility: HOSPITAL | Age: 32
LOS: 1 days | Discharge: HOME | End: 2017-11-09

## 2017-11-09 DIAGNOSIS — Z98.89 OTHER SPECIFIED POSTPROCEDURAL STATES: Chronic | ICD-10-CM

## 2017-11-09 DIAGNOSIS — Z98.890 OTHER SPECIFIED POSTPROCEDURAL STATES: Chronic | ICD-10-CM

## 2017-11-09 DIAGNOSIS — B18.1 CHRONIC VIRAL HEPATITIS B WITHOUT DELTA-AGENT: ICD-10-CM

## 2017-11-10 LAB — HBV E AG SER-ACNC: NEGATIVE — SIGNIFICANT CHANGE UP

## 2019-04-08 ENCOUNTER — EMERGENCY (EMERGENCY)
Facility: HOSPITAL | Age: 34
LOS: 0 days | Discharge: HOME | End: 2019-04-08
Attending: STUDENT IN AN ORGANIZED HEALTH CARE EDUCATION/TRAINING PROGRAM | Admitting: STUDENT IN AN ORGANIZED HEALTH CARE EDUCATION/TRAINING PROGRAM
Payer: MEDICAID

## 2019-04-08 VITALS
RESPIRATION RATE: 18 BRPM | TEMPERATURE: 98 F | HEART RATE: 77 BPM | OXYGEN SATURATION: 100 % | DIASTOLIC BLOOD PRESSURE: 88 MMHG | SYSTOLIC BLOOD PRESSURE: 124 MMHG

## 2019-04-08 VITALS — WEIGHT: 160.06 LBS | HEIGHT: 68 IN

## 2019-04-08 DIAGNOSIS — Z98.890 OTHER SPECIFIED POSTPROCEDURAL STATES: Chronic | ICD-10-CM

## 2019-04-08 DIAGNOSIS — R25.2 CRAMP AND SPASM: ICD-10-CM

## 2019-04-08 DIAGNOSIS — Z98.89 OTHER SPECIFIED POSTPROCEDURAL STATES: Chronic | ICD-10-CM

## 2019-04-08 DIAGNOSIS — G25.2 OTHER SPECIFIED FORMS OF TREMOR: ICD-10-CM

## 2019-04-08 LAB
ALBUMIN SERPL ELPH-MCNC: 4.3 G/DL — SIGNIFICANT CHANGE UP (ref 3.3–5)
ALP SERPL-CCNC: 60 U/L — SIGNIFICANT CHANGE UP (ref 40–120)
ALT FLD-CCNC: 39 U/L — SIGNIFICANT CHANGE UP (ref 12–78)
AMPHET UR-MCNC: NEGATIVE — SIGNIFICANT CHANGE UP
ANION GAP SERPL CALC-SCNC: 12 MMOL/L — SIGNIFICANT CHANGE UP (ref 5–17)
APPEARANCE UR: CLEAR — SIGNIFICANT CHANGE UP
AST SERPL-CCNC: 22 U/L — SIGNIFICANT CHANGE UP (ref 15–37)
BACTERIA # UR AUTO: ABNORMAL
BARBITURATES UR SCN-MCNC: NEGATIVE — SIGNIFICANT CHANGE UP
BASOPHILS # BLD AUTO: 0.08 K/UL — SIGNIFICANT CHANGE UP (ref 0–0.2)
BASOPHILS NFR BLD AUTO: 0.6 % — SIGNIFICANT CHANGE UP (ref 0–2)
BENZODIAZ UR-MCNC: NEGATIVE — SIGNIFICANT CHANGE UP
BILIRUB SERPL-MCNC: 1 MG/DL — SIGNIFICANT CHANGE UP (ref 0.2–1.2)
BILIRUB UR-MCNC: NEGATIVE — SIGNIFICANT CHANGE UP
BUN SERPL-MCNC: 11 MG/DL — SIGNIFICANT CHANGE UP (ref 7–23)
CALCIUM SERPL-MCNC: 8.8 MG/DL — SIGNIFICANT CHANGE UP (ref 8.5–10.1)
CHLORIDE SERPL-SCNC: 106 MMOL/L — SIGNIFICANT CHANGE UP (ref 96–108)
CK SERPL-CCNC: 120 U/L — SIGNIFICANT CHANGE UP (ref 26–308)
CO2 SERPL-SCNC: 23 MMOL/L — SIGNIFICANT CHANGE UP (ref 22–31)
COCAINE METAB.OTHER UR-MCNC: POSITIVE — SIGNIFICANT CHANGE UP
COLOR SPEC: YELLOW — SIGNIFICANT CHANGE UP
COMMENT - URINE: SIGNIFICANT CHANGE UP
CREAT SERPL-MCNC: 1.26 MG/DL — SIGNIFICANT CHANGE UP (ref 0.5–1.3)
DIFF PNL FLD: NEGATIVE — SIGNIFICANT CHANGE UP
EOSINOPHIL # BLD AUTO: 0.26 K/UL — SIGNIFICANT CHANGE UP (ref 0–0.5)
EOSINOPHIL NFR BLD AUTO: 2 % — SIGNIFICANT CHANGE UP (ref 0–6)
EPI CELLS # UR: SIGNIFICANT CHANGE UP
ETHANOL SERPL-MCNC: <3 MG/DL — SIGNIFICANT CHANGE UP (ref 0–10)
GLUCOSE SERPL-MCNC: 114 MG/DL — HIGH (ref 70–99)
GLUCOSE UR QL: NEGATIVE MG/DL — SIGNIFICANT CHANGE UP
HCT VFR BLD CALC: 48.5 % — SIGNIFICANT CHANGE UP (ref 39–50)
HGB BLD-MCNC: 16.3 G/DL — SIGNIFICANT CHANGE UP (ref 13–17)
IMM GRANULOCYTES NFR BLD AUTO: 0.5 % — SIGNIFICANT CHANGE UP (ref 0–1.5)
KETONES UR-MCNC: ABNORMAL
LEUKOCYTE ESTERASE UR-ACNC: ABNORMAL
LYMPHOCYTES # BLD AUTO: 18.7 % — SIGNIFICANT CHANGE UP (ref 13–44)
LYMPHOCYTES # BLD AUTO: 2.47 K/UL — SIGNIFICANT CHANGE UP (ref 1–3.3)
MAGNESIUM SERPL-MCNC: 1.7 MG/DL — SIGNIFICANT CHANGE UP (ref 1.6–2.6)
MCHC RBC-ENTMCNC: 30 PG — SIGNIFICANT CHANGE UP (ref 27–34)
MCHC RBC-ENTMCNC: 33.6 GM/DL — SIGNIFICANT CHANGE UP (ref 32–36)
MCV RBC AUTO: 89.2 FL — SIGNIFICANT CHANGE UP (ref 80–100)
METHADONE UR-MCNC: NEGATIVE — SIGNIFICANT CHANGE UP
MONOCYTES # BLD AUTO: 0.71 K/UL — SIGNIFICANT CHANGE UP (ref 0–0.9)
MONOCYTES NFR BLD AUTO: 5.4 % — SIGNIFICANT CHANGE UP (ref 2–14)
NEUTROPHILS # BLD AUTO: 9.66 K/UL — HIGH (ref 1.8–7.4)
NEUTROPHILS NFR BLD AUTO: 72.8 % — SIGNIFICANT CHANGE UP (ref 43–77)
NITRITE UR-MCNC: NEGATIVE — SIGNIFICANT CHANGE UP
NRBC # BLD: 0 /100 WBCS — SIGNIFICANT CHANGE UP (ref 0–0)
OPIATES UR-MCNC: NEGATIVE — SIGNIFICANT CHANGE UP
PCP SPEC-MCNC: SIGNIFICANT CHANGE UP
PCP UR-MCNC: NEGATIVE — SIGNIFICANT CHANGE UP
PH UR: 7 — SIGNIFICANT CHANGE UP (ref 5–8)
PLATELET # BLD AUTO: 306 K/UL — SIGNIFICANT CHANGE UP (ref 150–400)
POTASSIUM SERPL-MCNC: 3.4 MMOL/L — LOW (ref 3.5–5.3)
POTASSIUM SERPL-SCNC: 3.4 MMOL/L — LOW (ref 3.5–5.3)
PROT SERPL-MCNC: 8.2 GM/DL — SIGNIFICANT CHANGE UP (ref 6–8.3)
PROT UR-MCNC: 30 MG/DL
RBC # BLD: 5.44 M/UL — SIGNIFICANT CHANGE UP (ref 4.2–5.8)
RBC # FLD: 13 % — SIGNIFICANT CHANGE UP (ref 10.3–14.5)
RBC CASTS # UR COMP ASSIST: ABNORMAL /HPF (ref 0–4)
SODIUM SERPL-SCNC: 141 MMOL/L — SIGNIFICANT CHANGE UP (ref 135–145)
SP GR SPEC: 1.01 — SIGNIFICANT CHANGE UP (ref 1.01–1.02)
THC UR QL: NEGATIVE — SIGNIFICANT CHANGE UP
TSH SERPL-MCNC: 1.23 UU/ML — SIGNIFICANT CHANGE UP (ref 0.34–4.82)
UROBILINOGEN FLD QL: 4 MG/DL
WBC # BLD: 13.24 K/UL — HIGH (ref 3.8–10.5)
WBC # FLD AUTO: 13.24 K/UL — HIGH (ref 3.8–10.5)
WBC UR QL: ABNORMAL

## 2019-04-08 PROCEDURE — 93010 ELECTROCARDIOGRAM REPORT: CPT

## 2019-04-08 PROCEDURE — 99284 EMERGENCY DEPT VISIT MOD MDM: CPT

## 2019-04-08 PROCEDURE — 70450 CT HEAD/BRAIN W/O DYE: CPT | Mod: 26

## 2019-04-08 PROCEDURE — 71045 X-RAY EXAM CHEST 1 VIEW: CPT | Mod: 26

## 2019-04-08 RX ORDER — SODIUM CHLORIDE 9 MG/ML
2000 INJECTION INTRAMUSCULAR; INTRAVENOUS; SUBCUTANEOUS ONCE
Qty: 0 | Refills: 0 | Status: COMPLETED | OUTPATIENT
Start: 2019-04-08 | End: 2019-04-08

## 2019-04-08 RX ADMIN — SODIUM CHLORIDE 2000 MILLILITER(S): 9 INJECTION INTRAMUSCULAR; INTRAVENOUS; SUBCUTANEOUS at 18:24

## 2019-04-08 RX ADMIN — SODIUM CHLORIDE 2000 MILLILITER(S): 9 INJECTION INTRAMUSCULAR; INTRAVENOUS; SUBCUTANEOUS at 17:24

## 2019-04-08 NOTE — ED ADULT NURSE NOTE - CHIEF COMPLAINT QUOTE
sudden onset of muscle spasms and nausea without vomiting.  Drove to his friends house and then asked to come to ER with sudden onset of spasming.  denies fever.  patient did drink 6 shots last night, last drink at 4 pm, denies illicit drug use.  HR in 130's.  Patient was having difficulty ambulating from the car.    Patient evaluated by dr Ferrell, escalated to the main.   at triage.  Patient denies pain, N/V/D

## 2019-04-08 NOTE — ED ADULT TRIAGE NOTE - CHIEF COMPLAINT QUOTE
sudden onset of muscle spasms and nausea without vomiting.  Drove to his friends house and then asked to come to ER with sudden onset of spasming.  denies fever.  patient did drink 6 shots last night, last drink at 4 pm, denies illicit drug use.  HR in 130's.  Patient was having difficulty ambulating from the car.    Patient evaluated by dr Ferrell, escalated to the main.   at triage.  Patient denies pain sudden onset of muscle spasms and nausea without vomiting.  Drove to his friends house and then asked to come to ER with sudden onset of spasming.  denies fever.  patient did drink 6 shots last night, last drink at 4 pm, denies illicit drug use.  HR in 130's.  Patient was having difficulty ambulating from the car.    Patient evaluated by dr Ferrell, escalated to the main.   at triage.  Patient denies pain, N/V/D

## 2019-04-08 NOTE — ED PROVIDER NOTE - PROGRESS NOTE DETAILS
33 y/o male with no PMHx presents to the ED c/o suddenly onset muscle spasms to hands, feet and face and nausea starting one hour to 30minutes ago. Pt reports numbness/weakness to feet, hands and face, still currently numb at this times in the ED. Pt states that muscle spams have relieved at this time in the ED. Pt states that sx started while he was walking. Denies VD, fever, decreased PO intake. Pt admits to 6 shots of EtOH last night, last drink at 4pm. -Nathan Gao PA-C Results reviewed and discussed with pt. Counseled on ETOH and drug abuse. Discussed importance of close FU with PMD.  Pt asked to return to ED immediately for any new or concerning sx or worsening sx. Pt acknowledges and understands plan. -Nathan Gao PA-C

## 2019-04-08 NOTE — ED PROVIDER NOTE - PSH
History of eye surgery  left eye surgery: placement of lens in left eye s/p injury to eye.  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

## 2019-04-08 NOTE — ED PROVIDER NOTE - OBJECTIVE STATEMENT
35 y/o male with no PMHx presents to the ED c/o suddenly onset muscle spasms to hands, feet and face and nausea starting one hour to 30minutes ago. Pt reports numbness/weakness to feet, hands and face, still currently numb at this times in the ED. Pt states that muscle spams have relieved at this time in the ED. Pt states that sx started while he was walking. Denies VD, fever, decreased PO intake. Pt admits to 6 shots of EtOH last night, last drink at 4pm. Pt does not drink daily. Nonsmoker. 35 y/o male with no PMHx presents to the ED c/o suddenly onset muscle spasms to hands, feet and face and nausea starting one hour to 30minutes ago. Pt reports numbness/weakness to feet, hands and face, still currently numb at this times in the ED. Pt states that muscle spasms have relieved at this time in the ED. Pt states that sx started while he was walking. Denies VD, fever, decreased PO intake. Pt admits to 6 shots of EtOH last night, last drink at 4pm. Pt does not drink daily. Nonsmoker.

## 2019-04-08 NOTE — ED PROVIDER NOTE - ATTENDING CONTRIBUTION TO CARE
I, Ana Paula Francois DO,  performed the initial face to face bedside interview with this patient regarding history of present illness, review of symptoms and relevant past medical, social and family history.  I completed an independent physical examination.  I was the initial provider who evaluated this patient. I have signed out the follow up of any pending tests (i.e. labs, radiological studies) to the ACP.  I have communicated the patient’s plan of care and disposition with the ACP.  The history, relevant review of systems, past medical and surgical history, medical decision making, and physical examination was documented by the scribe in my presence and I attest to the accuracy of the documentation.

## 2020-05-26 NOTE — ED PROVIDER NOTE - TEMPLATE
Medication/Dose: Meloxicam 15MG  Patient last seen by PCP: 2/4/2020  Next office visit with PCP: None  Last Lab:2/4/2020    Above information requires contacting patient: No    Prescription does not require PDMP check    Sent to provider to approve or deny         Fluid/Electrolyte/Metabolic

## 2020-09-11 NOTE — ED ADULT NURSE NOTE - NS ED NOTE  TALK SOMEONE YN
Patient states the pharmacy only provided him with 60 tablets not the 75 that Dr Usha Mcgee prescibed I will contact the pharmacy and advise patient No

## 2021-01-12 NOTE — ED ADULT NURSE NOTE - CCCP TRG CHIEF CMPLNT
[FreeTextEntry1] : Patient is a 61 yo M who presents for BXO, urethral stricture.\par S/p penile urethrostomy\par Dressing replaced\par F/u Friday for catheter removal
see chief complaint quote

## 2021-02-01 NOTE — ED ADULT NURSE NOTE - NS ED NOTE ABUSE RESPONSE YN
Refill request received from [facility] for esomeprazole. Script was refilled per protocol.  Last lab 12/7/2020  LOV 12/7/2020  FOV 6/9/2021    
Yes

## 2021-04-12 ENCOUNTER — OUTPATIENT (OUTPATIENT)
Dept: OUTPATIENT SERVICES | Facility: HOSPITAL | Age: 36
LOS: 1 days | End: 2021-04-12
Payer: MEDICARE

## 2021-04-12 DIAGNOSIS — Z98.89 OTHER SPECIFIED POSTPROCEDURAL STATES: Chronic | ICD-10-CM

## 2021-04-12 DIAGNOSIS — Z20.828 CONTACT WITH AND (SUSPECTED) EXPOSURE TO OTHER VIRAL COMMUNICABLE DISEASES: ICD-10-CM

## 2021-04-12 DIAGNOSIS — Z98.890 OTHER SPECIFIED POSTPROCEDURAL STATES: Chronic | ICD-10-CM

## 2021-04-12 LAB — SARS-COV-2 RNA SPEC QL NAA+PROBE: SIGNIFICANT CHANGE UP

## 2021-04-12 PROCEDURE — C9803: CPT

## 2021-04-12 PROCEDURE — U0005: CPT

## 2021-04-12 PROCEDURE — U0003: CPT

## 2021-04-13 DIAGNOSIS — Z20.828 CONTACT WITH AND (SUSPECTED) EXPOSURE TO OTHER VIRAL COMMUNICABLE DISEASES: ICD-10-CM

## 2025-08-21 ENCOUNTER — EMERGENCY (EMERGENCY)
Facility: HOSPITAL | Age: 40
LOS: 0 days | Discharge: HOME | End: 2025-08-21
Attending: EMERGENCY MEDICINE
Payer: COMMERCIAL

## 2025-08-21 VITALS — HEIGHT: 68 IN | WEIGHT: 179.9 LBS

## 2025-08-21 VITALS
SYSTOLIC BLOOD PRESSURE: 124 MMHG | DIASTOLIC BLOOD PRESSURE: 77 MMHG | RESPIRATION RATE: 18 BRPM | OXYGEN SATURATION: 97 % | HEART RATE: 72 BPM

## 2025-08-21 DIAGNOSIS — Z98.890 OTHER SPECIFIED POSTPROCEDURAL STATES: Chronic | ICD-10-CM
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
## 2025-08-21 DIAGNOSIS — M79.672 PAIN IN LEFT FOOT: ICD-10-CM

## 2025-08-21 DIAGNOSIS — Z98.89 OTHER SPECIFIED POSTPROCEDURAL STATES: Chronic | ICD-10-CM

## 2025-08-21 PROCEDURE — 73630 X-RAY EXAM OF FOOT: CPT | Mod: LT

## 2025-08-21 PROCEDURE — 73630 X-RAY EXAM OF FOOT: CPT | Mod: 26,LT

## 2025-08-21 PROCEDURE — 99284 EMERGENCY DEPT VISIT MOD MDM: CPT | Mod: 25

## 2025-08-21 PROCEDURE — 29515 APPLICATION SHORT LEG SPLINT: CPT | Mod: LT

## 2025-08-21 PROCEDURE — 99284 EMERGENCY DEPT VISIT MOD MDM: CPT

## 2025-08-21 RX ORDER — OXYCODONE HYDROCHLORIDE 30 MG/1
1 TABLET ORAL
Qty: 12 | Refills: 0
Start: 2025-08-21 | End: 2025-08-23

## 2025-08-21 RX ORDER — IBUPROFEN 200 MG
600 TABLET ORAL ONCE
Refills: 0 | Status: COMPLETED | OUTPATIENT
Start: 2025-08-21 | End: 2025-08-21

## 2025-08-21 RX ADMIN — Medication 600 MILLIGRAM(S): at 15:16
